# Patient Record
Sex: FEMALE | Race: WHITE | NOT HISPANIC OR LATINO | URBAN - METROPOLITAN AREA
[De-identification: names, ages, dates, MRNs, and addresses within clinical notes are randomized per-mention and may not be internally consistent; named-entity substitution may affect disease eponyms.]

---

## 2018-03-08 ENCOUNTER — APPOINTMENT (RX ONLY)
Dept: URBAN - METROPOLITAN AREA CLINIC 304 | Facility: CLINIC | Age: 33
Setting detail: DERMATOLOGY
End: 2018-03-08

## 2018-03-08 DIAGNOSIS — Z41.9 ENCOUNTER FOR PROCEDURE FOR PURPOSES OTHER THAN REMEDYING HEALTH STATE, UNSPECIFIED: ICD-10-CM

## 2018-03-08 PROBLEM — F32.9 MAJOR DEPRESSIVE DISORDER, SINGLE EPISODE, UNSPECIFIED: Status: ACTIVE | Noted: 2018-03-08

## 2018-03-08 PROBLEM — F41.9 ANXIETY DISORDER, UNSPECIFIED: Status: ACTIVE | Noted: 2018-03-08

## 2018-03-08 PROCEDURE — ? OTHER

## 2018-03-08 PROCEDURE — ? PULSED-DYE LASER

## 2018-03-08 ASSESSMENT — LOCATION DETAILED DESCRIPTION DERM
LOCATION DETAILED: RIGHT SUPERIOR CENTRAL MALAR CHEEK
LOCATION DETAILED: LEFT MEDIAL FOREHEAD
LOCATION DETAILED: LEFT SUPERIOR CENTRAL MALAR CHEEK
LOCATION DETAILED: RIGHT INFERIOR MEDIAL MALAR CHEEK
LOCATION DETAILED: LEFT FOREHEAD
LOCATION DETAILED: LEFT NASAL ALA

## 2018-03-08 ASSESSMENT — LOCATION SIMPLE DESCRIPTION DERM
LOCATION SIMPLE: RIGHT CHEEK
LOCATION SIMPLE: LEFT FOREHEAD
LOCATION SIMPLE: LEFT CHEEK
LOCATION SIMPLE: LEFT NOSE

## 2018-03-08 ASSESSMENT — LOCATION ZONE DERM
LOCATION ZONE: NOSE
LOCATION ZONE: FACE

## 2018-03-08 NOTE — PROCEDURE: PULSED-DYE LASER
Spot Size: 7 mm pigmented lesion handpiece
Post-Care Instructions: I reviewed with the patient in detail post-care instructions. Patient should stay away from the sun and wear sun protection until treated areas are fully healed.
Immediate Endpoint: erythema
Cryogen Time (Ms): 30
Treated Area: small area
Spot Size: 7 mm
Laser Type: Vbeam Perfecta 595nm
Delay Time (Ms): 0
Price (Use Numbers Only, No Special Characters Or $): 100
Delay Time (Ms): 20
Detail Level: Simple
Consent: Verbal consent obtained, risks reviewed including but not limited to crusting, scabbing, blistering, scarring, darker or lighter pigmentary change, incidental hair removal, bruising, and/or incomplete removal.
Fluence In J/Cm2 (Optional): 10
Pulse Duration: 10 ms
Cryogen Time (Ms): 30
Post-Procedure Care: Vaseline and ice applied. Post care reviewed with patient.
Pulse Duration: 1.5 ms
Pulse Duration: 6 ms
Location (Required For Details To Render In Note But Body Touch Will Also Count For First Location): forehead

## 2018-03-08 NOTE — PROCEDURE: OTHER
Note Text (......Xxx Chief Complaint.): This diagnosis correlates with the
Detail Level: Simple
Other (Free Text): Cards for Rafael Ruiz and Wang Morales for blepharplasty
Other (Free Text): see photo for txmt regimen. Also rec'd Alondra LEONARD eye contour gel cream